# Patient Record
Sex: FEMALE | Race: WHITE | NOT HISPANIC OR LATINO | Employment: OTHER | URBAN - NONMETROPOLITAN AREA
[De-identification: names, ages, dates, MRNs, and addresses within clinical notes are randomized per-mention and may not be internally consistent; named-entity substitution may affect disease eponyms.]

---

## 2018-04-11 ENCOUNTER — OUTSIDE FACILITY SERVICE (OUTPATIENT)
Dept: CARDIOLOGY | Facility: CLINIC | Age: 68
End: 2018-04-11

## 2018-04-11 PROCEDURE — 93010 ELECTROCARDIOGRAM REPORT: CPT | Performed by: INTERNAL MEDICINE

## 2019-12-02 PROBLEM — C54.1 ENDOMETRIAL CARCINOMA (HCC): Status: ACTIVE | Noted: 2019-12-02

## 2019-12-03 PROBLEM — Z71.9 ENCOUNTER FOR CONSULTATION: Status: ACTIVE | Noted: 2019-12-03

## 2019-12-03 NOTE — PROGRESS NOTES
[facility]  MD Christine Brown BSN, PA-C  ________________________________________  [facility]  Department of Radiation Oncology  [address] [place] [id number]  Office:  [phone]  Fax: [phone]    DATE:  12/05/2019    PATIENT:   Lupe Huang 1950                                   MEDICAL RECORD #:  [id number]                                                       REASON FOR CONSULTATION:  Lupe Huang is a very pleasant 69 y.o. female that has been referred to our clinic by Rafa Olivera MD to discuss radiotherapy recommendations for recurrent endometrial cancer at the vaginal cough. Reports fatigue, SOB, and wheezing. Denies activity change, appetite change, unexpected weight change, cough, choking, chest tightness, light-headedness, weakness, and headaches. She follows  and .     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  She denies undergoing adjuvant radiation therapy and received carbo-taxol x6 cycles under Dr. Somers's care, completed in February 2019.    07/2018 - US vaginal:  • Noted 9.6 x 4.5 x 5 cm uterus with 2.9 cm EMS  • Ovaries appeared normal    08/2018 - D & C:  • Complex endometrial hyperplasia with moderate atypia.     08/21/2018 - Consult with :  • Recommended exploratory laparotomy with total abdominal hysterectomy with bilateral salpingo-oophorectomy with omentectomy possible lymphadenectomy and debulking.      09/01/2018 - Uterus, cervix, bilateral tubes and ovaries; total abdominal hysterectomy with bilateral salpingo-oophorectomy per :  Endometrium:   • Invasive adenocarcinoma of the endometrium, endometrioid type with squamous metaplasia, FIGO grade 1 with  invasion into the myometrium (61.1%) and focal superficial involvement of the lower uterine segment.   • No definitive lymphovascular invasion is identified.   • Pathologic stage: pT1b  Bilateral fallopian tubes and ovaries:  • Invasive high-grade serous carcinoma involving the bilateral ovaries and fallopian tubes.   • Serosal involvement is present in the ovaries and fallopian tubes.   Cervix:  • Atrophic cervix, no dysplasia or malignancy is identified.   Myometrium:  • Leiomyomata, largest 1.0 cm  Uterine serosa:  • No significant histopathologic abnormality.     10/04/2018 - Appointment with :  • I would favor treatment with six cycles of taxol/carbo in lieu of using brachy to treat the uterine cancer.   • Her surgery was hindered by dense adhesions so I am concerned there would be disease in other places.   • Will refer to med onc closer to home.     02/27/2019 - Completed Chemotherapy course:  • Carboplatin/Taxol x 6 administer per     11/01/2018 - PET Scan:  • Normal PET/CT with no hypermetabolic areas identified.     03/21/2019 - PET scan:  • Developing left upper lobe nodule which exhibits increased tracer activity relative to adjacent lung parenchyma. This 4 x 8 nodule exhibits an SUV of 1.7 and should be considered suspicious for metastatic disease.     05/23/2019 - PET Scan:  • There is focal area of increased uptake within the distal stomach with maximum SUV of 5.6 which may reflect physiologic activity. Upper endoscopy may be considered for further evaluation to exclude any underlying pathology.   • Otherwise unremarkable PET scan with resolution of the left upper lobe nodule and no definite evidence of hypermetabolic metastatic disease.     10/24/2019 - Vaginal cuff biopsy due to abnormal vaginal bleeding per :   • Adenocarcinmoa with mucinous featurres  • Endometrioid endometrial carcinoma  • + ER  • - p16  • Elevated Ki67     11/12/2019 - CT Chest with contrast:  • No  evidence of metastatic disease in the chest.   • Moderate emphysema.     2019 - CT Abdomen/Pelvis with contrast:  • No evidence of metastatic disease in the abdomen or pelvis.   • Mild fatty liver disease.    2019 - Appointment with :  • Reviewed CT scan noting no evidence of metastatic disease, but vaginal cuff biopsy by  indicates recurrence of endometrial cancer.   • Exam reveals small area at vaginal apex  • The recommended treatment is radiation  • Will refer closer to home to .   • Follow up in 6 months     2019 - Appointment with :  · Will do another CT in 6 months and follow up.   · Keep appointment with  for radiation therapy.     History obtained from  PATIENT, FAMILY and CHART    PAST MEDICAL HISTORY  No past medical history on file.     PAST SURGICAL HISTORY  No past surgical history on file.     FAMILY HISTORY  family history is not on file.     SOCIAL HISTORY   Social History     Tobacco Use   • Smoking status: Former Smoker     Packs/day: 1.00     Years: 20.00     Pack years: 20.00     Last attempt to quit:      Years since quittin.9   • Smokeless tobacco: Never Used   Substance Use Topics   • Alcohol use: Defer   • Drug use: Defer     ALLERGIES  Patient has no known allergies.     MEDICATIONS   Current Outpatient Medications   Medication Sig Dispense Refill   • albuterol sulfate  (90 Base) MCG/ACT inhaler Inhale 2 puffs Every 4 (Four) Hours As Needed for Wheezing.     • atenolol (TENORMIN) 25 MG tablet Take 25 mg by mouth Daily.     • cetirizine (zyrTEC) 10 MG tablet Take 10 mg by mouth Daily.     • Fluticasone-Umeclidin-Vilant (TRELEGY ELLIPTA) 100-62.5-25 MCG/INH aerosol powder  Inhale 1 each Daily.     • furosemide (LASIX) 20 MG tablet Take 20 mg by mouth 2 (Two) Times a Day.     • gabapentin (NEURONTIN) 300 MG capsule Take 300 mg by mouth 3 (Three) Times a Day. Takes 100 g in am and 300 mg at evenings     •  "ipratropium-albuterol (DUO-NEB) 0.5-2.5 mg/3 ml nebulizer Take 3 mL by nebulization Every 4 (Four) Hours As Needed for Wheezing.     • omeprazole (priLOSEC) 20 MG capsule Take 20 mg by mouth Daily.       No current facility-administered medications for this visit.       The following portions of the patient's history were reviewed and updated as appropriate: allergies, current medications, past family history, past medical history, past social history, past surgical history and problem list.    REVIEW OF SYSTEMS  Review of Systems   Constitutional: Positive for fatigue. Negative for activity change, appetite change, chills, diaphoresis, fever and unexpected weight change.   HENT: Negative.    Eyes: Negative.    Respiratory: Positive for shortness of breath and wheezing. Negative for apnea, cough, choking, chest tightness and stridor.         COPD  Oxygen  Inhalers   Cardiovascular: Negative.    Gastrointestinal: Negative.    Endocrine: Negative.    Genitourinary: Negative.    Musculoskeletal: Negative.         Neuropathy   Skin: Negative.    Allergic/Immunologic: Negative.    Neurological: Negative.    Hematological: Negative.    Psychiatric/Behavioral: Negative.      PHYSICAL EXAM  VITAL SIGNS:   Vitals:    12/05/19 1315   BP: 158/70   Weight: 106 kg (233 lb)   Height: 182.9 cm (72\")   PainSc: 0-No pain     General:  Alert and oriented, no acute distress, well developed, Vitals reviewed.  Head/Neck:  Normocephalic, without obvious abnormality, atraumatic.  The trachea is midline.   Nose/Sinuses:  Nares normal. Septum midline. Mucosa normal. No drainage or sinus tenderness.  Mouth/Throat:  Mucosa moist, no lesions; pharynx without erythema, edema or exudate.  Neck:  supple, no mass, non-tender  Eyes: No gross abnormalities,  no scleral jaundice or erythema.    Ears: Ears appear intact with no abnormalities noted, hearing grossly normal  Chest:  Respiratory efforts are normal and unlabored, chest is clear to " auscultation. There are no wheezes, rhonchi, rales.  Cardiovascular: Regular rate and rhythm without murmurs, rubs, or gallops.   Abdomen:  Soft, non-tender, normal bowel sounds; no noted organomegaly or masses. no CVA tenderness   Extremities:  D[place] well, warm to touch, no evidence of cyanosis or edema.  GYN: Deferred  Lymphatics:  No evidence of adenopathy in the cervical or supraclavicular areas.  Skin: No suspicious lesions or rashes of concern, skin color, texture, turgor are normal  Neurologic:  Alert and oriented, gait normal, strength and sensation grossly normal  Psych: Mood and affect are appropriate for circumstance. Eye contact is appropriate.     Performance Status: ECOG (0) Fully active, able to carry on all predisease performance without restriction    Clinical Quality Measures  Lupe Huang reports a pain score of 0. Given her pain assessment as noted, treatment options were discussed and the following options were decided upon as a follow-up plan to address the patient's pain: No pain, no plan given. .    -Advanced Care Planning Care plan discussed, no care plan provided  -Body Mass Index Screening and Follow-Up Plan Patient's Body mass index is 31.6 kg/m². BMI is above normal parameters. Recommendations include: educational material.  -Tobacco Use: Screening and Cessation Intervention Social History    Tobacco Use      Smoking status: Former Smoker        Packs/day: 1.00        Years: 20.00        Pack years: 20        Quit date:         Years since quittin.9      Smokeless tobacco: Never Used    ASSESSMENT AND PLAN   1. Endometrial carcinoma (CMS/HCC)    2. S/P MATTEO-BSO (total abdominal hysterectomy and bilateral salpingo-oophorectomy)    3. Former smoker    4. Encounter for consultation           Orders Placed This Encounter   Procedures   • NM Pet Skull Base To Mid Thigh     Standing Status:   Future     Standing Expiration Date:   2020     Order Specific Question:   What  radiopharmaceutical is preferred for this exam?     Answer:   FDG  (offered at all sites)   • MRI Pelvis Without Contrast     Standing Status:   Future     Standing Expiration Date:   12/5/2020     RECOMMENDATIONS: Lupe Huang has been referred to our office today to discuss radiotherapy recommendations for recurrent endometrial cancer of the vaginal apex/cuff.  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    We have discussed the indications and rationale of radiation therapy according to the NCCN Guidelines. I have extensively reviewed the risks, benefits and alternatives of therapy and progression of disease in spite of therapy with either local or systemic failure.  Typically, vaginal cuff recurrences are treated with combination pelvic external beam radiation therapy and brachytherapy in patients who have not previously received radiation therapy.    I have seen, examined and reviewed her medication list, appropriate labs and imaging studies as well as other physician notes.  Fortunately, imaging studies were not available to me at time of consultation but we will be requesting them.  We discussed the goals/plans of care and answered all questions.     After careful consideration of the diagnostic data and evaluation of the patient. I am recommending patient undergo PET scan for proper evaluation of local recurrence and to rule out occult metastatic disease and pelvic MRI to assess accurate size/extent of vaginal apex/cuff recurrence for proper radiation therapy planning. I am anticipating radiation therapy treatment with EBRT and intracavitary brachytherapy if local recurrence is not deemed to be bulky for which referral for candidacy of surgical options or interstitial brachytherapy  would be needed. She will follow up in 2 weeks to discuss scans and further radiation therapy recommendations.  It is possible that if no distant metastatic disease is found, she may be able to receive the external beam portion of her treatment locally in [place].    She and the family verbalize understanding of this discussion, voice no further questions and wishes to proceed with recommendations. Ordered PET scan and Pelvic MRI. Return to clinic for follow up appointment in 2 weeks or sooner if needed.      Thank you for allowing me to assist in this patients care.    Todays appointment time was spent in counseling and coordination of care as follows: diagnosis, intent of treatment discussing radiation therapy specifics: logistics, possible and probable side effects and after effects, staging of cancer, standard of care in for this stage of this cancer and treatment options.  I saw this patient in consultation with Christine Gasca PA-C while covering for Dr. Cassius Diaz, radiation oncologist.  Immanuel Schuster MD   12/05/2019

## 2019-12-04 ENCOUNTER — HOSPITAL ENCOUNTER (OUTPATIENT)
Dept: RADIATION ONCOLOGY | Facility: HOSPITAL | Age: 69
Setting detail: RADIATION/ONCOLOGY SERIES
End: 2019-12-04

## 2019-12-04 PROBLEM — Z90.710 S/P TAH-BSO (TOTAL ABDOMINAL HYSTERECTOMY AND BILATERAL SALPINGO-OOPHORECTOMY): Status: ACTIVE | Noted: 2019-12-04

## 2019-12-04 PROBLEM — Z90.79 S/P TAH-BSO (TOTAL ABDOMINAL HYSTERECTOMY AND BILATERAL SALPINGO-OOPHORECTOMY): Status: ACTIVE | Noted: 2019-12-04

## 2019-12-04 PROBLEM — Z90.722 S/P TAH-BSO (TOTAL ABDOMINAL HYSTERECTOMY AND BILATERAL SALPINGO-OOPHORECTOMY): Status: ACTIVE | Noted: 2019-12-04

## 2019-12-05 ENCOUNTER — CONSULT (OUTPATIENT)
Dept: RADIATION ONCOLOGY | Facility: HOSPITAL | Age: 69
End: 2019-12-05

## 2019-12-05 VITALS
BODY MASS INDEX: 31.56 KG/M2 | HEIGHT: 72 IN | WEIGHT: 233 LBS | SYSTOLIC BLOOD PRESSURE: 158 MMHG | DIASTOLIC BLOOD PRESSURE: 70 MMHG

## 2019-12-05 DIAGNOSIS — Z71.9 ENCOUNTER FOR CONSULTATION: ICD-10-CM

## 2019-12-05 DIAGNOSIS — C54.1 ENDOMETRIAL CARCINOMA (HCC): Primary | ICD-10-CM

## 2019-12-05 DIAGNOSIS — Z90.710 S/P TAH-BSO (TOTAL ABDOMINAL HYSTERECTOMY AND BILATERAL SALPINGO-OOPHORECTOMY): ICD-10-CM

## 2019-12-05 DIAGNOSIS — Z90.722 S/P TAH-BSO (TOTAL ABDOMINAL HYSTERECTOMY AND BILATERAL SALPINGO-OOPHORECTOMY): ICD-10-CM

## 2019-12-05 DIAGNOSIS — Z87.891 FORMER SMOKER: ICD-10-CM

## 2019-12-05 DIAGNOSIS — Z90.79 S/P TAH-BSO (TOTAL ABDOMINAL HYSTERECTOMY AND BILATERAL SALPINGO-OOPHORECTOMY): ICD-10-CM

## 2019-12-05 PROCEDURE — G0463 HOSPITAL OUTPT CLINIC VISIT: HCPCS | Performed by: RADIOLOGY

## 2019-12-05 RX ORDER — FUROSEMIDE 20 MG/1
20 TABLET ORAL DAILY
COMMUNITY

## 2019-12-05 RX ORDER — OMEPRAZOLE 20 MG/1
20 CAPSULE, DELAYED RELEASE ORAL DAILY
COMMUNITY

## 2019-12-05 RX ORDER — ALBUTEROL SULFATE 90 UG/1
2 AEROSOL, METERED RESPIRATORY (INHALATION) EVERY 4 HOURS PRN
COMMUNITY

## 2019-12-05 RX ORDER — CETIRIZINE HYDROCHLORIDE 10 MG/1
10 TABLET ORAL DAILY
COMMUNITY

## 2019-12-05 RX ORDER — GABAPENTIN 300 MG/1
300 CAPSULE ORAL 3 TIMES DAILY
COMMUNITY

## 2019-12-05 RX ORDER — ATENOLOL 25 MG/1
25 TABLET ORAL DAILY
COMMUNITY

## 2019-12-05 RX ORDER — IPRATROPIUM BROMIDE AND ALBUTEROL SULFATE 2.5; .5 MG/3ML; MG/3ML
3 SOLUTION RESPIRATORY (INHALATION) EVERY 4 HOURS PRN
COMMUNITY

## 2019-12-05 NOTE — PATIENT INSTRUCTIONS
Uterine Cancer    Uterine cancer is an abnormal growth of cancer tissue (malignant tumor) in the uterus. Unlike noncancerous (benign) tumors, malignant tumors can spread to other parts of the body. Uterine cancer usually occurs after menopause. However, it may also occur around the time that menopause begins.  The wall of the uterus has an inner layer of tissue (endometrium) and an outer layer of muscle tissue (myometrium). The most common type of uterine cancer begins in the endometrium (endometrial cancer). Cancer that begins in the myometrium (uterine sarcoma) is very rare.  What are the causes?  The exact cause of this condition is not known.  What increases the risk?  You are more likely to develop this condition if you:  · Are older than 50.  · Have an enlarged endometrium (endometrial hyperplasia).  · Use hormone therapy.  · Are severely overweight (obese).  · Use the medicine tamoxifen.  · You are white ().  · Cannot bear children (are infertile).  · Have never been pregnant.  · Started menstruating at an age younger than 12 years.  · Are older than 52 and are still having menstrual periods.  · Have a history of cancer of the ovaries, intestines, or colon or rectum (colorectal cancer).  · Have a history of enlarged ovaries with small cysts (polycystic ovarian syndrome).  · Have a family history of:  ? Uterine cancer.  ? Hereditary nonpolyposis colon cancer (HNPCC).  · Have diabetes, high blood pressure, thyroid disease, or gallbladder disease.  · Use long-term, high-dose birth control pills.  · Have been exposed to radiation.  · Smoke.  What are the signs or symptoms?  Symptoms of this condition include:  · Abnormal vaginal bleeding or discharge. Bleeding may start as a watery, blood-streaked flow that gradually contains more blood. This is the most common symptom. If you experience abnormal vaginal bleeding, do not assume that it is part of menopause.  · Vaginal bleeding after  menopause.  · Unexplained weight loss.  · Bleeding between periods.  · Urination that is difficult, painful, or more frequent than usual.  · A lump (mass) in the vagina.  · Pain, bloating, or fullness in the abdomen.  · Pain in the pelvic area.  · Pain during sex.  How is this diagnosed?  This condition may be diagnosed based on:  · Your medical history and your symptoms.  · A physical and pelvic exam. Your health care provider will feel your pelvis for any growths or enlarged lymph nodes.  · Blood and urine tests.  · Imaging tests, such as X-rays, CT scans, ultrasound, or MRIs.  · A procedure in which a thin, flexible tube with a light and camera on the end is inserted through the vagina and used to look inside the uterus (hysteroscopy).  · A Pap test to check for abnormal cells in the lower part of the uterus (cervix) and the upper vagina.  · Removing a tissue sample (biopsy) from the uterine lining to check for cancer cells.  · Dilation and curettage (D&C). This is a procedure that involves stretching (dilation) the cervix and scraping (curettage) the inside lining of the uterus to get a biopsy and check for cancer cells.  Your cancer will be staged to determine its severity and extent. Staging is an assessment of:  · The size of the tumor.  · Whether the cancer has spread.  · Where the cancer has spread.  The stages of uterine cancer are as follows:  · Stage I. The cancer is only found in the uterus.  · Stage II. The cancer has spread to the cervix.  · Stage III. The cancer has spread outside the uterus, but not outside the pelvis. The cancer may have spread to the lymph nodes in the pelvis. Lymph nodes are part of your body's disease-fighting (immune) system. Lymph nodes are found in many locations in your body, including the neck, underarm, and groin.  · Stage IV. The cancer has spread to other parts of the body, such as the bladder or rectum.  How is this treated?  This condition is often treated with surgery  to remove:  · The uterus, cervix, fallopian tubes, and ovaries (total hysterectomy).  · The uterus and cervix (simple hysterectomy).  The type of hysterectomy you will have depends on the extent of your cancer. Lymph nodes near the uterus may also be removed in some cases. Treatment may also include one or more of the following:  · Chemotherapy. This uses medicines to kill the cancer cells and prevent their spread.  · Radiation therapy. This uses high-energy rays to kill the cancer cells and prevent the spread of cancer.  · Chemoradiation. This is a combination treatment that alternates chemotherapy with radiation treatments to enhance the way radiation works.  · Brachytherapy. This involves placing radioactive materials inside the body where the cancer was removed.  · Hormone therapy. This includes taking medicines that lower the levels of estrogen in the body.  Follow these instructions at home:  Activity  · Return to your normal activities as told by your health care provider. Ask your health care provider what activities are safe for you.  · Exercise regularly as told by your health care provider.  · Do not drive or use heavy machinery while taking prescription pain medicine.  General instructions  · Take over-the-counter and prescription medicines only as told by your health care provider.  · Maintain a healthy diet.  · Work with your health care provider to:  ? Manage any long-term (chronic) conditions you have, such as diabetes, high blood pressure, thyroid disease, or gallbladder disease.  ? Manage any side effects of your treatment.  · Do not use any products that contain nicotine or tobacco, such as cigarettes and e-cigarettes. If you need help quitting, ask your health care provider.  · Consider joining a support group to help you cope with stress. Your health care provider may be able to recommend a local or online support group.  · Keep all follow-up visits as told by your health care provider. This is  important.  Where to find more information  · American Cancer Society: https://www.cancer.org  · National [employer] [place] (NCI): https://www.cancer.gov  Contact a health care provider if:  · You have pain in your pelvis or abdomen that gets worse.  · You cannot urinate.  · You have abnormal bleeding.  · You have a fever.  Get help right away if:  · You develop sudden or new severe symptoms, such as:  ? Heavy bleeding.  ? Severe weakness.  ? Pain that is severe or does not get better with medicine.  Summary  · Uterine cancer is an abnormal growth of cancer tissue (malignant tumor) in the uterus. The most common type of uterine cancer begins in the endometrium (endometrial cancer).  · This condition is often treated with surgery to remove the uterus, cervix, fallopian tubes, and ovaries (total hysterectomy) or the uterus and cervix (simple hysterectomy).  · Work with your health care provider to manage any long-term (chronic) conditions you have, such as diabetes, high blood pressure, thyroid disease, or gallbladder disease.  · Consider joining a support group to help you cope with stress. Your health care provider may be able to recommend a local or online support group.  This information is not intended to replace advice given to you by your health care provider. Make sure you discuss any questions you have with your health care provider.  Document Released: 12/18/2006 Document Revised: 12/15/2017 Document Reviewed: 12/15/2017  [employer] Interactive Patient Education © 2019 [employer]    Internal Radiation Therapy  Internal radiation therapy is a procedure that can be used to treat many different types of cancer. It uses a type of energy (ionizing radiation) to kill or shrink cancer cells. During this procedure, radioactive material is placed inside the body, close to a tumor or directly into a tumor. The radioactive material is usually contained inside of an implant, such as a capsule or seed.  The amount of  radiation you will receive and the length of your therapy will depend on your medical condition. There are different types of internal radiation therapy.  · Brachytherapy. This type of therapy involves placing a source of radiation inside the body. There are different ways of receiving brachytherapy.  ? A low-dose implant gives off a low dose of radiation for one to several days. The applicator may be left in place or removed between treatments. The applicator and implant are removed after the last treatment has been done.  ? A high-dose implant gives off a high dose of radiation for only a few minutes. The applicator may be left in place or removed between treatments. The applicator and implant are removed after the last treatment has been done.  ? A permanent implant stays in the body after placement. It gives off radiation for weeks or months. After the radiation is gone, the implant can remain harmlessly inside the body.  · Intraoperative radiation therapy. This type of radiation is delivered during surgery to remove a tumor, in case the surgeon cannot remove all cancer cells.  Tell a health care provider about:  · Any allergies you have.  · All medicines you are taking, including vitamins, herbs, eye drops, creams, and over-the-counter medicines.  · Any problems you or family members have had with anesthetic medicines.  · Any blood disorders you have.  · Any surgeries you have had.  · Any medical conditions you have.  · Whether you are pregnant, may be pregnant, or are breastfeeding.  What are the risks?  Generally, this is a safe procedure. However, problems may occur, including:  · Allergic reactions to medicines.  · Bleeding.  · Infection.  · Tissue damage.  · Treatment failure.  Radiation therapy can place you at higher risk for a second cancer later in life. Ask your health care provider if you have other risks that are specific to your cancer.  Most people have side effects from radiation therapy. Side  effects depend on the amount and location of radiation. Common side effects include:  · Soreness, bruising, or swelling.  · Nausea and vomiting.  · Diarrhea.  · Fatigue.  What happens before the procedure?  Medicines  · Ask your health care provider about:  ? Changing or stopping your regular medicines. This is especially important if you are taking diabetes medicines or blood thinners.  ? Taking medicines such as aspirin and ibuprofen. These medicines can thin your blood. Do not take these medicines unless your doctor tells you to take them.  ? Taking over-the-counter medicines, vitamins, herbs, and supplements.  · Take medicine to clean out your bowel (bowel prep) as directed.  General instructions  · Follow instructions from your health care provider about eating or drinking restrictions.  · You may have a complete physical exam, blood tests, or imaging tests.  · Do not use any tobacco products, including cigarettes, chewing tobacco, and e-cigarettes, as told by your health care provider. If you need help quitting, ask your health care provider.  · Do not drink alcohol.  · Plan to have someone take you home from the hospital or clinic.  · Plan to have a responsible adult care for you for at least 24 hours after you leave the hospital or clinic. This is important.  · Ask your health care provider what steps will be taken to help prevent infection. These may include:  ? Removing hair at the surgery site.  ? Washing skin with a germ-killing soap.  ? Antibiotic medicine.  What happens during the procedure?  · An IV will be inserted into one of your veins.  · You may be given one or more of the following:  ? A medicine to help you relax (sedative).  ? A medicine to numb the area (local anesthetic).  ? A medicine to make you fall asleep (general anesthetic).  · If a long, thin tube (catheter) will be used, an incision may be made where the catheter will be inserted.  · A delivery tool (applicator) will be inserted into  your body and guided toward the cancer. The applicator may be a catheter, a needle, or a different type of applicator. An X-ray, ultrasound, MRI, or CT scan will be used to help guide the applicator.  · The implant will be placed into your body through the applicator. The implant may be a capsule, seed, tube, ribbon, balloon, wire, or needle.  · The applicator may be removed, or it may be left in place.  · If you have an incision, it will be:  ? Closed with stitches (sutures), staples, or adhesive strips.  ? Covered with a bandage (dressing).  The procedure may vary among health care providers and hospitals.  What happens after the procedure?    · Your blood pressure, heart rate, breathing rate, and blood oxygen level will be monitored until you leave the hospital or clinic.  · If you are having low-dose or high-dose therapy over several days, you may need to stay in the hospital during treatment.  · If you are having high-dose therapy, you may need to avoid contact with people during your hospital stay. You may also need to limit or avoid contact with others for a certain amount of time after you leave the hospital. Ask your health care provider if this applies to you.  · Do not drive until your health care provider approves. If you were given a sedative, you should not drive for 24 hours.  Summary  · Internal radiation therapy uses a type of energy (ionizing radiation) to kill or shrink cancer cells.  · During this procedure, radioactive material is put inside your body, close to a tumor or directly into a tumor. The material is usually contained within an implant.  · To place the implant in your body, your surgeon may use a catheter, a needle, or a different type of applicator.  · There are different types of internal radiation, including a low-dose implant, a high-dose implant, a permanent implant, or intraoperative radiation therapy.  This information is not intended to replace advice given to you by your  health care provider. Make sure you discuss any questions you have with your health care provider.  Document Released: 05/03/2016 Document Revised: 11/12/2018 Document Reviewed: 11/12/2018  [employer] Interactive Patient Education © 2019 [employer]

## 2019-12-18 ENCOUNTER — HOSPITAL ENCOUNTER (OUTPATIENT)
Dept: MRI IMAGING | Facility: HOSPITAL | Age: 69
Discharge: HOME | End: 2019-12-18
Admitting: RADIOLOGY

## 2019-12-18 ENCOUNTER — HOSPITAL ENCOUNTER (OUTPATIENT)
Dept: CT IMAGING | Facility: HOSPITAL | Age: 69
Discharge: HOME | End: 2019-12-18

## 2019-12-18 DIAGNOSIS — C54.1 ENDOMETRIAL CARCINOMA (HCC): ICD-10-CM

## 2019-12-18 DIAGNOSIS — Z90.710 S/P TAH-BSO (TOTAL ABDOMINAL HYSTERECTOMY AND BILATERAL SALPINGO-OOPHORECTOMY): ICD-10-CM

## 2019-12-18 DIAGNOSIS — Z71.9 ENCOUNTER FOR CONSULTATION: ICD-10-CM

## 2019-12-18 DIAGNOSIS — Z90.79 S/P TAH-BSO (TOTAL ABDOMINAL HYSTERECTOMY AND BILATERAL SALPINGO-OOPHORECTOMY): ICD-10-CM

## 2019-12-18 DIAGNOSIS — Z90.722 S/P TAH-BSO (TOTAL ABDOMINAL HYSTERECTOMY AND BILATERAL SALPINGO-OOPHORECTOMY): ICD-10-CM

## 2019-12-18 LAB — CREAT BLDA-MCNC: 0.9 MG/DL (ref 0.6–1.3)

## 2019-12-18 PROCEDURE — A9577 INJ MULTIHANCE: HCPCS | Performed by: RADIOLOGY

## 2019-12-18 PROCEDURE — 72197 MRI PELVIS W/O & W/DYE: CPT

## 2019-12-18 PROCEDURE — 0 FLUDEOXYGLUCOSE F18 SOLUTION: Performed by: SPECIALIST

## 2019-12-18 PROCEDURE — 0 GADOBENATE DIMEGLUMINE 529 MG/ML SOLUTION: Performed by: RADIOLOGY

## 2019-12-18 PROCEDURE — 78815 PET IMAGE W/CT SKULL-THIGH: CPT

## 2019-12-18 PROCEDURE — A9552 F18 FDG: HCPCS | Performed by: SPECIALIST

## 2019-12-18 PROCEDURE — 82565 ASSAY OF CREATININE: CPT

## 2019-12-18 RX ADMIN — FLUDEOXYGLUCOSE F18 1 DOSE: 300 INJECTION INTRAVENOUS at 10:54

## 2019-12-18 RX ADMIN — GADOBENATE DIMEGLUMINE 17 ML: 529 INJECTION, SOLUTION INTRAVENOUS at 09:42

## 2019-12-23 ENCOUNTER — OFFICE VISIT (OUTPATIENT)
Dept: RADIATION ONCOLOGY | Facility: HOSPITAL | Age: 69
End: 2019-12-23

## 2019-12-23 VITALS
DIASTOLIC BLOOD PRESSURE: 69 MMHG | SYSTOLIC BLOOD PRESSURE: 175 MMHG | WEIGHT: 236 LBS | BODY MASS INDEX: 31.97 KG/M2 | HEIGHT: 72 IN

## 2019-12-23 DIAGNOSIS — Z90.722 S/P TAH-BSO (TOTAL ABDOMINAL HYSTERECTOMY AND BILATERAL SALPINGO-OOPHORECTOMY): ICD-10-CM

## 2019-12-23 DIAGNOSIS — C54.1 ENDOMETRIAL CARCINOMA (HCC): Primary | ICD-10-CM

## 2019-12-23 DIAGNOSIS — Z87.891 FORMER SMOKER: ICD-10-CM

## 2019-12-23 DIAGNOSIS — Z90.710 S/P TAH-BSO (TOTAL ABDOMINAL HYSTERECTOMY AND BILATERAL SALPINGO-OOPHORECTOMY): ICD-10-CM

## 2019-12-23 DIAGNOSIS — Z90.79 S/P TAH-BSO (TOTAL ABDOMINAL HYSTERECTOMY AND BILATERAL SALPINGO-OOPHORECTOMY): ICD-10-CM

## 2019-12-23 PROCEDURE — G0463 HOSPITAL OUTPT CLINIC VISIT: HCPCS | Performed by: RADIOLOGY

## 2020-01-02 ENCOUNTER — HOSPITAL ENCOUNTER (OUTPATIENT)
Dept: RADIATION ONCOLOGY | Facility: HOSPITAL | Age: 70
Setting detail: RADIATION/ONCOLOGY SERIES
End: 2020-01-02

## 2020-01-14 ENCOUNTER — HOSPITAL ENCOUNTER (OUTPATIENT)
Dept: RADIATION ONCOLOGY | Facility: HOSPITAL | Age: 70
Setting detail: RADIATION/ONCOLOGY SERIES
Discharge: HOME | End: 2020-01-14

## 2020-01-14 ENCOUNTER — INFUSION (OUTPATIENT)
Dept: ONCOLOGY | Facility: HOSPITAL | Age: 70
End: 2020-01-14

## 2020-01-14 VITALS
OXYGEN SATURATION: 92 % | HEIGHT: 72 IN | SYSTOLIC BLOOD PRESSURE: 140 MMHG | HEART RATE: 71 BPM | WEIGHT: 236 LBS | RESPIRATION RATE: 18 BRPM | DIASTOLIC BLOOD PRESSURE: 91 MMHG | BODY MASS INDEX: 31.97 KG/M2 | TEMPERATURE: 97.8 F

## 2020-01-14 DIAGNOSIS — Z90.79 S/P TAH-BSO (TOTAL ABDOMINAL HYSTERECTOMY AND BILATERAL SALPINGO-OOPHORECTOMY): ICD-10-CM

## 2020-01-14 DIAGNOSIS — Z71.9 ENCOUNTER FOR CONSULTATION: ICD-10-CM

## 2020-01-14 DIAGNOSIS — C54.1 ENDOMETRIAL CARCINOMA (HCC): Primary | ICD-10-CM

## 2020-01-14 DIAGNOSIS — Z87.891 FORMER SMOKER: ICD-10-CM

## 2020-01-14 DIAGNOSIS — Z90.710 S/P TAH-BSO (TOTAL ABDOMINAL HYSTERECTOMY AND BILATERAL SALPINGO-OOPHORECTOMY): ICD-10-CM

## 2020-01-14 DIAGNOSIS — Z90.722 S/P TAH-BSO (TOTAL ABDOMINAL HYSTERECTOMY AND BILATERAL SALPINGO-OOPHORECTOMY): ICD-10-CM

## 2020-01-14 PROCEDURE — 77290 THER RAD SIMULAJ FIELD CPLX: CPT | Performed by: RADIOLOGY

## 2020-01-14 PROCEDURE — 77332 RADIATION TREATMENT AID(S): CPT | Performed by: RADIOLOGY

## 2020-01-14 PROCEDURE — 57156 INS VAG BRACHYTX DEVICE: CPT | Performed by: RADIOLOGY

## 2020-01-14 PROCEDURE — 77316 BRACHYTX ISODOSE PLAN SIMPLE: CPT | Performed by: RADIOLOGY

## 2020-01-14 PROCEDURE — 77770 HDR RDNCL NTRSTL/ICAV BRCHTX: CPT | Performed by: RADIOLOGY

## 2020-01-14 PROCEDURE — C1717 BRACHYTX, NON-STR,HDR IR-192: HCPCS | Performed by: RADIOLOGY

## 2020-01-14 PROCEDURE — G0463 HOSPITAL OUTPT CLINIC VISIT: HCPCS

## 2020-01-14 PROCEDURE — 96523 IRRIG DRUG DELIVERY DEVICE: CPT

## 2020-01-14 RX ORDER — SODIUM CHLORIDE 0.9 % (FLUSH) 0.9 %
10 SYRINGE (ML) INJECTION AS NEEDED
Status: DISCONTINUED | OUTPATIENT
Start: 2020-01-14 | End: 2020-01-14 | Stop reason: HOSPADM

## 2020-01-14 RX ORDER — SODIUM CHLORIDE 0.9 % (FLUSH) 0.9 %
10 SYRINGE (ML) INJECTION AS NEEDED
Status: CANCELLED | OUTPATIENT
Start: 2020-01-14

## 2020-01-14 RX ORDER — SODIUM CHLORIDE 0.9 % (FLUSH) 0.9 %
10 SYRINGE (ML) INJECTION AS NEEDED
OUTPATIENT
Start: 2020-01-14

## 2020-01-14 RX ORDER — HEPARIN SODIUM (PORCINE) LOCK FLUSH IV SOLN 100 UNIT/ML 100 UNIT/ML
500 SOLUTION INTRAVENOUS AS NEEDED
OUTPATIENT
Start: 2020-01-14

## 2020-01-14 RX ADMIN — Medication 500 UNITS: at 14:30

## 2020-01-14 RX ADMIN — SODIUM CHLORIDE, PRESERVATIVE FREE 10 ML: 5 INJECTION INTRAVENOUS at 14:30

## 2020-01-22 ENCOUNTER — HOSPITAL ENCOUNTER (OUTPATIENT)
Dept: RADIATION ONCOLOGY | Facility: HOSPITAL | Age: 70
Setting detail: RADIATION/ONCOLOGY SERIES
End: 2020-01-22

## 2020-01-22 PROCEDURE — 77770 HDR RDNCL NTRSTL/ICAV BRCHTX: CPT | Performed by: RADIOLOGY

## 2020-01-22 PROCEDURE — 57156 INS VAG BRACHYTX DEVICE: CPT | Performed by: RADIOLOGY

## 2020-01-22 PROCEDURE — 77316 BRACHYTX ISODOSE PLAN SIMPLE: CPT | Performed by: RADIOLOGY

## 2020-01-22 PROCEDURE — C1717 BRACHYTX, NON-STR,HDR IR-192: HCPCS | Performed by: RADIOLOGY

## 2020-01-22 PROCEDURE — 77290 THER RAD SIMULAJ FIELD CPLX: CPT | Performed by: RADIOLOGY

## 2020-01-29 ENCOUNTER — LAB (OUTPATIENT)
Dept: LAB | Facility: HOSPITAL | Age: 70
End: 2020-01-29

## 2020-01-29 ENCOUNTER — HOSPITAL ENCOUNTER (OUTPATIENT)
Dept: RADIATION ONCOLOGY | Facility: HOSPITAL | Age: 70
Setting detail: RADIATION/ONCOLOGY SERIES
Discharge: HOME | End: 2020-01-29

## 2020-01-29 DIAGNOSIS — R39.198 DIFFICULTY IN URINATION: ICD-10-CM

## 2020-01-29 DIAGNOSIS — R39.11 URINARY HESITANCY: ICD-10-CM

## 2020-01-29 DIAGNOSIS — R30.0 DYSURIA: ICD-10-CM

## 2020-01-29 DIAGNOSIS — R39.11 URINARY HESITANCY: Primary | ICD-10-CM

## 2020-01-29 LAB
BACTERIA UR QL AUTO: ABNORMAL /HPF
BILIRUB UR QL STRIP: NEGATIVE
CLARITY UR: CLEAR
COLOR UR: YELLOW
GLUCOSE UR STRIP-MCNC: NEGATIVE MG/DL
HGB UR QL STRIP.AUTO: ABNORMAL
HYALINE CASTS UR QL AUTO: ABNORMAL /LPF
KETONES UR QL STRIP: NEGATIVE
LEUKOCYTE ESTERASE UR QL STRIP.AUTO: NEGATIVE
NITRITE UR QL STRIP: NEGATIVE
PH UR STRIP.AUTO: 8.5 [PH] (ref 5–8)
PROT UR QL STRIP: NEGATIVE
RBC # UR: ABNORMAL /HPF
REF LAB TEST METHOD: ABNORMAL
SP GR UR STRIP: 1.02 (ref 1–1.03)
SQUAMOUS #/AREA URNS HPF: ABNORMAL /HPF
UROBILINOGEN UR QL STRIP: ABNORMAL
WBC UR QL AUTO: ABNORMAL /HPF

## 2020-01-29 PROCEDURE — 57156 INS VAG BRACHYTX DEVICE: CPT | Performed by: RADIOLOGY

## 2020-01-29 PROCEDURE — 77770 HDR RDNCL NTRSTL/ICAV BRCHTX: CPT | Performed by: RADIOLOGY

## 2020-01-29 PROCEDURE — 77290 THER RAD SIMULAJ FIELD CPLX: CPT | Performed by: RADIOLOGY

## 2020-01-29 PROCEDURE — 81001 URINALYSIS AUTO W/SCOPE: CPT

## 2020-01-29 PROCEDURE — C1717 BRACHYTX, NON-STR,HDR IR-192: HCPCS | Performed by: RADIOLOGY

## 2020-01-29 PROCEDURE — 77316 BRACHYTX ISODOSE PLAN SIMPLE: CPT | Performed by: RADIOLOGY

## 2020-02-03 ENCOUNTER — HOSPITAL ENCOUNTER (OUTPATIENT)
Dept: RADIATION ONCOLOGY | Facility: HOSPITAL | Age: 70
Setting detail: RADIATION/ONCOLOGY SERIES
End: 2020-02-03

## 2020-02-06 ENCOUNTER — HOSPITAL ENCOUNTER (OUTPATIENT)
Dept: RADIATION ONCOLOGY | Facility: HOSPITAL | Age: 70
Setting detail: RADIATION/ONCOLOGY SERIES
Discharge: HOME | End: 2020-02-06

## 2020-02-06 PROCEDURE — 77770 HDR RDNCL NTRSTL/ICAV BRCHTX: CPT | Performed by: RADIOLOGY

## 2020-02-06 PROCEDURE — C1717 BRACHYTX, NON-STR,HDR IR-192: HCPCS | Performed by: RADIOLOGY

## 2020-02-06 PROCEDURE — 77290 THER RAD SIMULAJ FIELD CPLX: CPT | Performed by: RADIOLOGY

## 2020-02-06 PROCEDURE — 77316 BRACHYTX ISODOSE PLAN SIMPLE: CPT | Performed by: RADIOLOGY

## 2020-02-06 PROCEDURE — 57156 INS VAG BRACHYTX DEVICE: CPT | Performed by: RADIOLOGY

## 2020-02-12 ENCOUNTER — APPOINTMENT (OUTPATIENT)
Dept: RADIATION ONCOLOGY | Facility: HOSPITAL | Age: 70
End: 2020-02-12

## 2020-02-12 ENCOUNTER — HOSPITAL ENCOUNTER (OUTPATIENT)
Dept: RADIATION ONCOLOGY | Facility: HOSPITAL | Age: 70
Setting detail: RADIATION/ONCOLOGY SERIES
Discharge: HOME | End: 2020-02-12

## 2020-02-12 PROCEDURE — C1717 BRACHYTX, NON-STR,HDR IR-192: HCPCS

## 2020-02-12 PROCEDURE — 77770 HDR RDNCL NTRSTL/ICAV BRCHTX: CPT

## 2020-02-12 PROCEDURE — 77336 RADIATION PHYSICS CONSULT: CPT | Performed by: RADIOLOGY

## 2020-02-12 PROCEDURE — 77316 BRACHYTX ISODOSE PLAN SIMPLE: CPT

## 2020-02-12 PROCEDURE — 77290 THER RAD SIMULAJ FIELD CPLX: CPT | Performed by: RADIOLOGY

## 2020-02-12 PROCEDURE — 57156 INS VAG BRACHYTX DEVICE: CPT

## 2020-07-16 ENCOUNTER — TELEPHONE (OUTPATIENT)
Dept: RADIATION ONCOLOGY | Facility: HOSPITAL | Age: 70
End: 2020-07-16

## 2020-07-16 NOTE — TELEPHONE ENCOUNTER
Patient cx'd 7/20 follow up with Dr. Cassius Diaz due to recently seen Dr. Olivera and scans were clear.  She states she will call back to reschedule at later date.